# Patient Record
Sex: MALE | Race: WHITE
[De-identification: names, ages, dates, MRNs, and addresses within clinical notes are randomized per-mention and may not be internally consistent; named-entity substitution may affect disease eponyms.]

---

## 2018-09-18 PROBLEM — Z00.00 ENCOUNTER FOR PREVENTIVE HEALTH EXAMINATION: Status: ACTIVE | Noted: 2018-09-18

## 2018-10-11 ENCOUNTER — APPOINTMENT (OUTPATIENT)
Dept: ORTHOPEDIC SURGERY | Facility: CLINIC | Age: 78
End: 2018-10-11
Payer: MEDICARE

## 2018-10-11 DIAGNOSIS — Z78.9 OTHER SPECIFIED HEALTH STATUS: ICD-10-CM

## 2018-10-11 DIAGNOSIS — M25.511 PAIN IN RIGHT SHOULDER: ICD-10-CM

## 2018-10-11 PROCEDURE — 99214 OFFICE O/P EST MOD 30 MIN: CPT | Mod: 25

## 2018-10-11 PROCEDURE — 20610 DRAIN/INJ JOINT/BURSA W/O US: CPT | Mod: RT

## 2018-10-11 PROCEDURE — 73030 X-RAY EXAM OF SHOULDER: CPT | Mod: RT

## 2018-12-20 ENCOUNTER — APPOINTMENT (OUTPATIENT)
Dept: ORTHOPEDIC SURGERY | Facility: CLINIC | Age: 78
End: 2018-12-20
Payer: MEDICARE

## 2018-12-20 DIAGNOSIS — M67.921 UNSPECIFIED DISORDER OF SYNOVIUM AND TENDON, RIGHT UPPER ARM: ICD-10-CM

## 2018-12-20 DIAGNOSIS — M19.019 PRIMARY OSTEOARTHRITIS, UNSPECIFIED SHOULDER: ICD-10-CM

## 2018-12-20 DIAGNOSIS — M75.121 COMPLETE ROTATOR CUFF TEAR OR RUPTURE OF RIGHT SHOULDER, NOT SPECIFIED AS TRAUMATIC: ICD-10-CM

## 2018-12-20 PROCEDURE — 99213 OFFICE O/P EST LOW 20 MIN: CPT

## 2019-03-25 ENCOUNTER — APPOINTMENT (OUTPATIENT)
Dept: ORTHOPEDIC SURGERY | Facility: CLINIC | Age: 79
End: 2019-03-25
Payer: MEDICARE

## 2019-03-25 PROCEDURE — 20610 DRAIN/INJ JOINT/BURSA W/O US: CPT | Mod: RT

## 2019-03-25 PROCEDURE — 73562 X-RAY EXAM OF KNEE 3: CPT | Mod: RT

## 2019-03-25 PROCEDURE — 99213 OFFICE O/P EST LOW 20 MIN: CPT | Mod: 25

## 2019-04-08 ENCOUNTER — APPOINTMENT (OUTPATIENT)
Dept: ORTHOPEDIC SURGERY | Facility: CLINIC | Age: 79
End: 2019-04-08
Payer: MEDICARE

## 2019-04-08 PROCEDURE — 20610 DRAIN/INJ JOINT/BURSA W/O US: CPT | Mod: RT

## 2019-05-20 ENCOUNTER — APPOINTMENT (OUTPATIENT)
Dept: ORTHOPEDIC SURGERY | Facility: CLINIC | Age: 79
End: 2019-05-20
Payer: MEDICARE

## 2019-05-20 PROCEDURE — 99213 OFFICE O/P EST LOW 20 MIN: CPT

## 2019-07-08 ENCOUNTER — APPOINTMENT (OUTPATIENT)
Dept: ORTHOPEDIC SURGERY | Facility: CLINIC | Age: 79
End: 2019-07-08
Payer: MEDICARE

## 2019-07-08 DIAGNOSIS — Z86.39 PERSONAL HISTORY OF OTHER ENDOCRINE, NUTRITIONAL AND METABOLIC DISEASE: ICD-10-CM

## 2019-07-08 DIAGNOSIS — Z87.39 PERSONAL HISTORY OF OTHER DISEASES OF THE MUSCULOSKELETAL SYSTEM AND CONNECTIVE TISSUE: ICD-10-CM

## 2019-07-08 DIAGNOSIS — Z86.79 PERSONAL HISTORY OF OTHER DISEASES OF THE CIRCULATORY SYSTEM: ICD-10-CM

## 2019-07-08 PROCEDURE — 99213 OFFICE O/P EST LOW 20 MIN: CPT | Mod: 25

## 2019-07-08 PROCEDURE — 20610 DRAIN/INJ JOINT/BURSA W/O US: CPT | Mod: RT

## 2019-07-08 NOTE — ADDENDUM
[FreeTextEntry1] : I, Yoel Anaya, acted solely as a scribe for Dr. Chico Sifuentes on this date 07/08/2019 \par All medical record entries made by the Scribe were at my, Dr. Chico Sifuentes, direction and personally dictated by me on 07/08/2019 . I have reviewed the chart and agree that the record accurately reflects my personal performance of the history, physical exam, assessment and plan. I have also personally directed, reviewed, and agreed with the chart.

## 2019-07-08 NOTE — PHYSICAL EXAM
[UE/LE] : Sensory: Intact in bilateral upper & lower extremities [de-identified] : Constitutional\par o Appearance : well-nourished, well developed, alert, in no acute distress \par Head and Face\par o Head :\par ¦ Inspection : atraumatic, normocephalic\par Neurologic\par o Mental Status Examination :\par ¦ Orientation : alert and oriented X 3\par Psychiatric\par o Mood and Affect: mood normal, affect appropriate \par Cardiovascular\par o Observation/Palpation : - no swelling,normal pulses, normal temperature \par Lymphatic\par o Additional Nodes : No palpable lymph nodes present \par \par Right Lower Extremity\par Hip:\par o Buttock : no tenderness, swelling or deformities \par o Right  Hip :\par ¦ Inspection/Palpation : no tenderness, swelling or deformities\par ¦ Range of Motion : full and painless in all planes, no crepitance\par ¦ Stability : joint stability intact\par ¦ Strength : extension, flexion, adduction, abduction, internal rotation and external rotation 4+/5 \par  \par o Right Knee : peripatellar but minimal medial compartment tenderness to palpation, no swelling, hypertrophic changes of the medial compartment\par ¦ Range of Motion :5-120 degrees without pain, decreased patellofemoral glide with crepitance\par ¦ Stability : mild valgus or varus instability present on provocative testing\par ¦ Strength : flexion and extension 4+/5\par ¦ Tests and Signs : negative Anterior Drawer\par ¦ Dorsalis Pedis Artery : pulse 2+\par ¦ Posterior Tibial Artery : pulse 2+ \par \par Left Lower Extremity\par Hip:\par o Buttock : no tenderness, swelling or deformities \par o Left Hip :\par ¦ Inspection/Palpation : no tenderness, swelling or deformities\par ¦ Range of Motion : full and painless in all planes, no crepitance\par ¦ Stability : joint stability intact\par ¦ Strength : extension, flexion, adduction, abduction, internal rotation and external rotation 5/5 \par  \par o  Left Knee :\par ¦ Inspection/Palpation : parapatellar medial compartment tenderness to palpation, no swelling\par ¦ Range of Motion : 0-120 degrees, no crepitance\par ¦ Stability : no valgus or varus instability present on provocative testing\par ¦ Strength : flexion and extension 5/5\par ¦ Tests and Signs : negative Anterior Drawer, negative Lachman, negative Suellen  \par \par Gait and Station\par Gait: varus thrust on the right when walking with cane in the right hand, no significant extremity swelling or lymphedema, trophic changes of the lower extremity bilaterally\par \par o Knee injection : Indication- knee osteoarthritis, Anatomic location- right intra-articular joint space, Spray - area was sterilized with Betadine and alcohol and anesthetized with Ethyl Chloride , needle used-20G, Medications given- 5cc's lidocaine, 0.5cc's kenalog, 0.5 cc's dexamethasone, and patient tolerated it well.\par \par

## 2019-07-08 NOTE — DISCUSSION/SUMMARY
[de-identified] : I discussed the underlying pathophysiology of the patient's condition in great detail with the patient. The patient elected to receive a cortisone injection into the right knee, and tolerated it well. The use of ice and rest was reviewed with the patient. I told the patient to extend and bend his leg, and to take it easy today. He may resume his activities tomorrow. The patient is not a candidate for a TKR at this time due to medical comorbidities. \par \par FU in 4-6 weeks

## 2019-07-08 NOTE — HISTORY OF PRESENT ILLNESS
[de-identified] : 79 year old male presents with right knee pain. He states that when he stands from a sitting position, he feels a sharp pain in her right knee. He does not note any pain when he walks. He is known to have significant arthritis of his right knee, but is not a candidate for a TKR due to his medical issues. XRays from March show significant tricompartmental osteoarthritis. He is being maintained with cortisone injections.Viscosupplimentation has not helped in the past. . He ambulates with a cane.

## 2020-01-29 ENCOUNTER — APPOINTMENT (OUTPATIENT)
Dept: ORTHOPEDIC SURGERY | Facility: CLINIC | Age: 80
End: 2020-01-29
Payer: MEDICARE

## 2020-01-29 PROCEDURE — 73562 X-RAY EXAM OF KNEE 3: CPT | Mod: RT

## 2020-01-29 PROCEDURE — 99214 OFFICE O/P EST MOD 30 MIN: CPT | Mod: 25

## 2020-01-29 PROCEDURE — 20610 DRAIN/INJ JOINT/BURSA W/O US: CPT | Mod: RT

## 2020-11-25 ENCOUNTER — APPOINTMENT (OUTPATIENT)
Dept: ORTHOPEDIC SURGERY | Facility: CLINIC | Age: 80
End: 2020-11-25
Payer: MEDICARE

## 2020-11-25 PROCEDURE — 20610 DRAIN/INJ JOINT/BURSA W/O US: CPT | Mod: RT

## 2020-11-25 PROCEDURE — 99213 OFFICE O/P EST LOW 20 MIN: CPT | Mod: 25

## 2021-04-08 ENCOUNTER — APPOINTMENT (OUTPATIENT)
Dept: ORTHOPEDIC SURGERY | Facility: CLINIC | Age: 81
End: 2021-04-08

## 2021-08-09 ENCOUNTER — APPOINTMENT (OUTPATIENT)
Dept: ORTHOPEDIC SURGERY | Facility: CLINIC | Age: 81
End: 2021-08-09
Payer: MEDICARE

## 2021-08-09 PROCEDURE — 73562 X-RAY EXAM OF KNEE 3: CPT | Mod: RT

## 2021-08-09 PROCEDURE — 20610 DRAIN/INJ JOINT/BURSA W/O US: CPT | Mod: RT

## 2021-08-09 PROCEDURE — 99213 OFFICE O/P EST LOW 20 MIN: CPT | Mod: 25

## 2021-08-09 NOTE — PHYSICAL EXAM
[UE/LE] : Sensory: Intact in bilateral upper & lower extremities [de-identified] : Constitutional\par o Appearance : well-nourished, well developed, alert, in no acute distress \par Head and Face\par o Head :\par ¦ Inspection : atraumatic, normocephalic\par Neurologic\par o Mental Status Examination :\par ¦ Orientation : alert and oriented X 3\par Psychiatric\par o Mood and Affect: mood normal, affect appropriate \par Cardiovascular\par o Observation/Palpation : - no swelling,normal pulses, normal temperature \par Lymphatic\par o Additional Nodes : No palpable lymph nodes present \par \par Right Lower Extremity\par Hip:\par o Buttock : no tenderness, swelling or deformities \par o Right  Hip :\par ¦ Inspection/Palpation : no tenderness, swelling or deformities\par ¦ Range of Motion : full and painless in all planes, no crepitance\par ¦ Stability : joint stability intact\par ¦ Strength : extension, flexion, adduction, abduction, internal rotation and external rotation 5/5 \par  \par o Right Knee : medial compartment tenderness, no swelling, severe varus deformity, hypertrophic changes of the medial compartment\par ¦ Range of Motion : 2-115°, decreased patellofemoral glide with crepitance\par ¦ Stability : valgus / varus instability present on provocative testing\par ¦ Strength : flexion and extension 5/5\par ¦ Tests and Signs : negative Anterior Drawer, negative Lachman, negative Suellen\par \par Left Lower Extremity\par o Buttock : no tenderness, swelling or deformities \par o Left Hip :\par ¦ Inspection/Palpation : no tenderness, swelling or deformities\par ¦ Range of Motion : full and painless in all planes, no crepitance\par ¦ Stability : joint stability intact\par ¦ Strength : extension, flexion, adduction, abduction, internal rotation and external rotation 5/5 \par  \par o Left Knee :\par ¦ Inspection/Palpation : parapatellar medial compartment tenderness to palpation, no swelling\par ¦ Range of Motion : 0-120°, no crepitance\par ¦ Stability : no valgus or varus instability present on provocative testing\par ¦ Strength : flexion and extension 5/5\par ¦ Tests and Signs : negative Anterior Drawer, negative Lachman, negative Suellen  \par \par Gait and Station\par Gait: varus thrust on the right when walking with cane in the right hand, no significant extremity swelling or lymphedema, trophic changes of both ankles, poor balance and difficulty with steps\par \par Radiology Results\par o Right Knee : Standing AP, lateral and tunnel views of the knee were obtained and reveal bone on bone in the medial compartment with severe patellofemoral arthritis and calcification of the femoral and popliteal artery.\par \par o Right Knee injection : Indication- knee osteoarthritis, Anatomic location- right intra-articular joint space, Spray - area was sterilized with Betadine and alcohol and anesthetized with Ethyl Chloride , needle used-20G, Medications given- 5cc's lidocaine, 0.5cc's kenalog, 0.5 cc's dexamethasone, and patient tolerated it well.

## 2021-08-09 NOTE — HISTORY OF PRESENT ILLNESS
[de-identified] : 81 year old male presents complaining of right knee pain. He is known to have severe arthritis of his right knee, but is not a candidate for a TKR due to his cardiac history. He is being maintained with cortisone injections. Viscosupplementation has not helped in the past. At his last visit on 11/25/2020 he received a cortisone injection. He notes this injcetion seemed to help until recently. He presents for another cortisone injection today (08/09/2021). Pmhx: 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 He is on baby aspirin and no other blood thinners. He is fully COVID-19 vaccinated.

## 2021-08-09 NOTE — ADDENDUM
[FreeTextEntry1] : I, Freeman Atkins, acted solely as a scribe for Dr. Chico Sifuentes on this date 08/09/2021.\par All medical record entries made by the Scribe were at my, Dr. Chico Sifuentes, direction and personally dictated by me on 08/09/2021. I have reviewed the chart and agree that the record accurately reflects my personal performance of the history, physical exam, assessment and plan. I have also personally directed, reviewed, and agreed with the chart.

## 2021-08-09 NOTE — DISCUSSION/SUMMARY
[de-identified] : I discussed the underlying pathophysiology of the patient's condition in great detail with the patient. I went over the patient's x-rays with them in great detail. The patient elected to receive a cortisone injection into his right knee today, and tolerated it well. I instructed the patient on ROM exercises, and told them to take it easy. The use of ice and rest was reviewed with the patient. The patient may resume activities tomorrow. All of his questions were answered. He understands and consents to the plan.\par \par FU in 6-8 weeks.\par after a right knee cortisone injection today (08/09/2021).

## 2021-11-15 ENCOUNTER — APPOINTMENT (OUTPATIENT)
Dept: ORTHOPEDIC SURGERY | Facility: CLINIC | Age: 81
End: 2021-11-15
Payer: MEDICARE

## 2021-11-15 DIAGNOSIS — M47.816 SPONDYLOSIS W/OUT MYELOPATHY OR RADICULOPATHY, LUMBAR REGION: ICD-10-CM

## 2021-11-15 DIAGNOSIS — R26.89 OTHER ABNORMALITIES OF GAIT AND MOBILITY: ICD-10-CM

## 2021-11-15 PROCEDURE — 99214 OFFICE O/P EST MOD 30 MIN: CPT | Mod: 25

## 2021-11-15 PROCEDURE — 20610 DRAIN/INJ JOINT/BURSA W/O US: CPT | Mod: RT

## 2021-11-15 NOTE — ADDENDUM
[FreeTextEntry1] : I, Freeman Atkins, acted solely as a scribe for Dr. Chico Sifuentes on this date 11/15/2021.\par All medical record entries made by the Scribe were at my, Dr. Chico Sifuentes, direction and personally dictated by me on 11/15/2021. I have reviewed the chart and agree that the record accurately reflects my personal performance of the history, physical exam, assessment and plan. I have also personally directed, reviewed, and agreed with the chart.

## 2021-11-15 NOTE — HISTORY OF PRESENT ILLNESS
[de-identified] : 81 year old male presents complaining of right knee pain. 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 He is being maintained with cortisone injections. Viscosupplementation has not helped in the past. His most recent cortisone injection was on 8/9/2021 and he reports it lasted for about 1 to 1.5 months. 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 He started ambulating with a walker 2 weeks ago. He notes that he is losing the feeling in his feet due to a brain injury. He is currently in PT 2x/week. Pmhx: 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 He is on baby aspirin and no other blood thinners. He is fully COVID-19 vaccinated. \par \par Radiology Results taken on 8/9/2021:\par o Right Knee : Standing AP, lateral and tunnel views of the knee were obtained and reveal bone on bone in the medial compartment with severe patellofemoral arthritis and calcification of the femoral and popliteal artery.

## 2021-11-15 NOTE — PHYSICAL EXAM
[UE/LE] : Sensory: Intact in bilateral upper & lower extremities [de-identified] : Constitutional\par o Appearance : well-nourished, well developed, alert, in no acute distress \par Head and Face\par o Head :\par ¦ Inspection : atraumatic, normocephalic\par Neurologic\par o Mental Status Examination :\par ¦ Orientation : alert and oriented X 3\par Psychiatric\par o Mood and Affect: mood normal, affect appropriate \par Cardiovascular\par o Observation/Palpation : - no swelling,normal pulses, normal temperature \par Lymphatic\par o Additional Nodes : No palpable lymph nodes present \par \par Right Lower Extremity\par Hip:\par o Buttock : no tenderness, swelling or deformities \par o Right  Hip :\par ¦ Inspection/Palpation : no tenderness, swelling or deformities\par ¦ Range of Motion : full and painless in all planes, no crepitance\par ¦ Stability : joint stability intact\par ¦ Strength : extension, flexion, adduction, abduction, internal rotation and external rotation 5/5 \par  \par o Right Knee :\par ¦ Inspection/Palpation : medial compartment tenderness, no swelling, severe varus deformity, hypertrophic change of the medial compartment, skin is very thin\par ¦ Range of Motion : 0-105°, decreased patellofemoral glide with crepitance\par ¦ Stability : valgus / varus instability present on provocative testing\par ¦ Strength : flexion and extension 5/5\par ¦ Tests and Signs : negative Anterior Drawer, negative Lachman, negative Suellen\par \par Left Lower Extremity\par o Buttock : no tenderness, swelling or deformities \par o Left Hip :\par ¦ Inspection/Palpation : no tenderness, swelling or deformities\par ¦ Range of Motion : full and painless in all planes, no crepitance\par ¦ Stability : joint stability intact\par ¦ Strength : extension, flexion, adduction, abduction, internal rotation and external rotation 5/5 \par  \par o Left Knee :\par ¦ Inspection/Palpation : parapatellar medial compartment tenderness to palpation, no swelling\par ¦ Range of Motion : 0-120°, no crepitance\par ¦ Stability : no valgus or varus instability present on provocative testing\par ¦ Strength : flexion and extension 5/5\par ¦ Tests and Signs : negative Anterior Drawer, negative Lachman, negative Suellen  \par \par Gait and Station\par Gait: ambulating with a walker, varus thrust on the right, no significant extremity swelling or lymphedema, trophic changes of both ankles, poor balance and difficulty with steps, no foot drop\par \par o Right Knee injection : Indication- knee osteoarthritis, Anatomic location- right intra-articular joint space, Spray - area was sterilized with Betadine and alcohol and anesthetized with Ethyl Chloride , needle used-20G, Medications given- 5cc's lidocaine, 0.5cc's kenalog, 0.5 cc's dexamethasone, and patient tolerated it well.

## 2021-11-15 NOTE — DISCUSSION/SUMMARY
[de-identified] : I went over the pathophysiology of the patient's symptoms in great detail with the patient. I am recommending the patient continue going to physical therapy for balance. A prescription was provided. The patient elected to receive a cortisone injection into his right knee today, and tolerated it well. I instructed the patient on ROM exercises, and told them to take it easy. The use of ice and rest was reviewed with the patient. The patient may resume activities tomorrow. I am recommending we try viscosupplementation again because he cannot have surgery. He will follow-up in 2-3 weeks for a right knee Monovisc injection. All of his questions were answered. He understands and consents to the plan.\par \par FU 2-3 weeks.\par after continuing PT for balance.\par after a right knee cortisone injection today (11/15/2021).\par for a right knee Monovisc injection.  \par

## 2021-12-06 ENCOUNTER — APPOINTMENT (OUTPATIENT)
Dept: ORTHOPEDIC SURGERY | Facility: CLINIC | Age: 81
End: 2021-12-06
Payer: MEDICARE

## 2021-12-06 PROCEDURE — 20611 DRAIN/INJ JOINT/BURSA W/US: CPT | Mod: RT

## 2021-12-06 NOTE — DISCUSSION/SUMMARY
[de-identified] : I went over the pathophysiology of the patient's symptoms in great detail with the patient. The patient elected to receive a Monovisc injection into his right knee today and tolerated it well. I instructed the patient on ROM exercises and told them to take it easy. The use of ice and rest was reviewed with the patient. The patient may resume activities tomorrow. I reminded the patient that it takes 4 to 6 weeks after the injection to feel symptom relief. All of his questions were answered. He understands and consents to the plan. \par \par FU 6 weeks.\par after a right knee Monovisc injection today (12/06/2021).

## 2021-12-06 NOTE — PHYSICAL EXAM
[UE/LE] : Sensory: Intact in bilateral upper & lower extremities [de-identified] : Constitutional\par o Appearance : well-nourished, well developed, alert, in no acute distress \par Head and Face\par o Head :\par ¦ Inspection : atraumatic, normocephalic\par Neurologic\par o Mental Status Examination :\par ¦ Orientation : alert and oriented X 3\par Psychiatric\par o Mood and Affect: mood normal, affect appropriate \par Cardiovascular\par o Observation/Palpation : - no swelling,normal pulses, normal temperature \par Lymphatic\par o Additional Nodes : No palpable lymph nodes present \par \par Right Lower Extremity\par Hip:\par o Buttock : no tenderness, swelling or deformities \par o Right  Hip :\par ¦ Inspection/Palpation : no tenderness, swelling or deformities\par ¦ Range of Motion : full and painless in all planes, no crepitance\par ¦ Stability : joint stability intact\par ¦ Strength : extension, flexion, adduction, abduction, internal rotation and external rotation 5/5 \par  \par o Right Knee :\par ¦ Inspection/Palpation : medial compartment tenderness, no swelling, severe varus deformity, hypertrophic change of the medial compartment, skin is very thin, ecchymosis where his cortisone injection was, trophic changes\par ¦ Range of Motion : 3-120°, decreased patellofemoral glide with crepitance\par ¦ Stability : valgus / varus instability present on provocative testing\par ¦ Strength : flexion and extension 5/5\par ¦ Tests and Signs : negative Anterior Drawer, negative Lachman, negative Suellen\par \par Left Lower Extremity\par o Buttock : no tenderness, swelling or deformities \par o Left Hip :\par ¦ Inspection/Palpation : no tenderness, swelling or deformities\par ¦ Range of Motion : full and painless in all planes, no crepitance\par ¦ Stability : joint stability intact\par ¦ Strength : extension, flexion, adduction, abduction, internal rotation and external rotation 5/5 \par  \par o Left Knee :\par ¦ Inspection/Palpation : parapatellar medial compartment tenderness to palpation, no swelling\par ¦ Range of Motion : 0-120°, no crepitance\par ¦ Stability : no valgus or varus instability present on provocative testing\par ¦ Strength : flexion and extension 5/5\par ¦ Tests and Signs : negative Anterior Drawer, negative Lachman, negative Suellen  \par \par Gait and Station\par Gait: ambulating with a walker, varus thrust on the right, no significant extremity swelling or lymphedema, trophic changes of both ankles, poor balance and difficulty with steps, no foot drop\par \par o Right Knee injection : Indication- knee osteoarthritis, Anatomic location- right intra-articular joint space, Spray - area was sterilized with Betadine and alcohol and anesthetized with Ethyl Chloride, needle used-20G, Medications given- 4cc's Monovisc under Ultrasound guidance, and patient tolerated it well.  oLot# [id number]  oExp#: [id number]

## 2021-12-06 NOTE — ADDENDUM
[FreeTextEntry1] : I, Freeman Atkins, acted solely as a scribe for Dr. Chico Sifuentes on this date 12/06/2021.\par All medical record entries made by the Scribe were at my, Dr. Chico Sifuentes, direction and personally dictated by me on 12/06/2021. I have reviewed the chart and agree that the record accurately reflects my personal performance of the history, physical exam, assessment and plan. I have also personally directed, reviewed, and agreed with the chart.

## 2021-12-06 NOTE — HISTORY OF PRESENT ILLNESS
[de-identified] : 81 year old male presents complaining of right knee pain. 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 He is being maintained with cortisone injections. Viscosupplementation has not helped in the past. He received a cortisone injection on 11/15/21 but does not think it helped. He presents for a Monovisc injection today (12/06/2021). 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 He is ambulating with a walker. He is currently in PT 2x/week. Pmhx: 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 He is on baby aspirin and no other blood thinners. He is fully COVID-19 vaccinated. \par \par Radiology Results taken on 8/9/2021:\par o Right Knee : Standing AP, lateral and tunnel views of the knee were obtained and reveal bone on bone in the medial compartment with severe patellofemoral arthritis and calcification of the femoral and popliteal artery.

## 2022-05-12 ENCOUNTER — APPOINTMENT (OUTPATIENT)
Dept: ORTHOPEDIC SURGERY | Facility: CLINIC | Age: 82
End: 2022-05-12
Payer: MEDICARE

## 2022-05-12 PROCEDURE — 20610 DRAIN/INJ JOINT/BURSA W/O US: CPT | Mod: RT

## 2022-05-12 PROCEDURE — 99213 OFFICE O/P EST LOW 20 MIN: CPT | Mod: 25

## 2022-05-12 NOTE — DISCUSSION/SUMMARY
[de-identified] : I went over the pathophysiology of the patient's symptoms in great detail with the patient and his wife. The patient elected to receive a cortisone injection into his right knee today and tolerated it well. I instructed the patient on ROM exercises and told them to take it easy. The use of ice and rest was reviewed with the patient. The patient may resume activities tomorrow. I advised him to ride the bike on low tension. All of their questions were answered. They understand and consent to the plan. \par \par FU PRN\par after a right knee cortisone injection today (05/12/2022).

## 2022-05-12 NOTE — PHYSICAL EXAM
[UE/LE] : Sensory: Intact in bilateral upper & lower extremities [de-identified] : Constitutional\par o Appearance : well-nourished, well developed, alert, in no acute distress \par Head and Face\par o Head :\par ¦ Inspection : atraumatic, normocephalic\par Neurologic\par o Mental Status Examination :\par ¦ Orientation : alert and oriented X 3\par Psychiatric\par o Mood and Affect: mood normal, affect appropriate \par Cardiovascular\par o Observation/Palpation : - no swelling,normal pulses, normal temperature \par Lymphatic\par o Additional Nodes : No palpable lymph nodes present \par \par Right Lower Extremity\par o Buttock : no tenderness, swelling or deformities \par o Right Hip :\par ¦ Inspection/Palpation : no tenderness, swelling or deformities\par ¦ Range of Motion : full and painless in all planes, no crepitance\par ¦ Stability : joint stability intact\par ¦ Strength : extension, flexion, adduction, abduction, internal rotation and external rotation 5/5 \par  \par o Right Knee :\par ¦ Inspection/Palpation : medial compartment tenderness, no swelling, severe varus deformity, hypertrophic change of the medial compartment, skin is very thin, trophic changes\par ¦ Range of Motion : 3-120°, decreased patellofemoral glide with crepitance\par ¦ Stability : valgus / varus instability present on provocative testing\par ¦ Strength : flexion and extension 5/5\par ¦ Tests and Signs : negative Anterior Drawer, negative Lachman, negative Suellen\par \par Left Lower Extremity\par o Buttock : no tenderness, swelling or deformities \par o Left Hip :\par ¦ Inspection/Palpation : no tenderness, swelling or deformities\par ¦ Range of Motion : full and painless in all planes, no crepitance\par ¦ Stability : joint stability intact\par ¦ Strength : extension, flexion, adduction, abduction, internal rotation and external rotation 5/5 \par  \par o Left Knee :\par ¦ Inspection/Palpation : parapatellar medial compartment tenderness to palpation, no swelling\par ¦ Range of Motion : 0-120°, no crepitance\par ¦ Stability : no valgus or varus instability present on provocative testing\par ¦ Strength : flexion and extension 5/5\par ¦ Tests and Signs : negative Anterior Drawer, negative Lachman, negative Suellen  \par \par Gait and Station\par Gait: ambulating with a walker, varus thrust on the right, no significant extremity swelling or lymphedema, trophic changes of both legs, poor balance and difficulty with steps, no foot drop\par \par o Right Knee injection : Indication- knee osteoarthritis, Anatomic location- right intra-articular joint space, Spray - area was sterilized with Betadine and alcohol and anesthetized with Ethyl Chloride , needle used-20G, Medications given- 5cc's lidocaine, 0.5cc's kenalog, 0.5 cc's dexamethasone, and patient tolerated it well.

## 2022-05-12 NOTE — ADDENDUM
[FreeTextEntry1] : I, Freeman Atkins, acted solely as a scribe for Dr. Chico Sifuentes on this date 05/12/2022.\par All medical record entries made by the Scribe were at my, Dr. Chico Sifuentes, direction and personally dictated by me on 05/12/2022. I have reviewed the chart and agree that the record accurately reflects my personal performance of the history, physical exam, assessment and plan. I have also personally directed, reviewed, and agreed with the chart.

## 2022-05-12 NOTE — HISTORY OF PRESENT ILLNESS
[de-identified] : 82 year old male presents complaining of right knee pain. 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 He is being maintained with cortisone injections. HA injections have not helped in the past. He received a cortisone injection on 11/15/21 and a Monovisc injection on 12/06/21. He does not think the ge shot helped. He has been going to the gym and riding the bike, and feels a little better after. His knee has been acting up recently and he is requesting another cortisone injection today (05/12/2022). 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 He is ambulating with a walker. He participates in PT twice a week. 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 He is on baby aspirin and no other blood thinners.  \par \par Radiology Results taken on 8/9/2021:\par o Right Knee : Standing AP, lateral and tunnel views of the knee were obtained and reveal bone on bone in the medial compartment with severe patellofemoral arthritis and calcification of the femoral and popliteal artery.

## 2022-08-15 ENCOUNTER — APPOINTMENT (OUTPATIENT)
Dept: ORTHOPEDIC SURGERY | Facility: CLINIC | Age: 82
End: 2022-08-15

## 2022-08-15 PROCEDURE — 20610 DRAIN/INJ JOINT/BURSA W/O US: CPT | Mod: RT

## 2022-08-15 PROCEDURE — 99213 OFFICE O/P EST LOW 20 MIN: CPT | Mod: 25

## 2022-08-15 PROCEDURE — 73564 X-RAY EXAM KNEE 4 OR MORE: CPT | Mod: RT

## 2022-08-15 NOTE — DISCUSSION/SUMMARY
[de-identified] : I went over the pathophysiology of the patient's symptoms in great detail with the patient and his wife. I went over the patient's x-rays with them in great detail. The patient elected to receive a cortisone injection into his right knee today and tolerated it well. I instructed the patient on ROM exercises and told them to take it easy. The use of ice and rest was reviewed with the patient. The patient may resume activities tomorrow. All of their questions were answered. They understand and consent to the plan.\par \par FU in 1 month.\par after a right knee cortisone injection today (08/15/2022).

## 2022-08-15 NOTE — PHYSICAL EXAM
[UE/LE] : Sensory: Intact in bilateral upper & lower extremities [de-identified] : Constitutional\par o Appearance : well-nourished, well developed, alert, in no acute distress \par Head and Face\par o Head :\par ¦ Inspection : atraumatic, normocephalic\par Neurologic\par o Mental Status Examination :\par ¦ Orientation : alert and oriented X 3\par Psychiatric\par o Mood and Affect: mood normal, affect appropriate \par Cardiovascular\par o Observation/Palpation : - no swelling,normal pulses, normal temperature \par Lymphatic\par o Additional Nodes : No palpable lymph nodes present \par \par Right Lower Extremity\par o Buttock : no tenderness, swelling or deformities \par o Right Hip :\par ¦ Inspection/Palpation : no tenderness, swelling or deformities\par ¦ Range of Motion : full and painless in all planes, no crepitance\par ¦ Stability : joint stability intact\par ¦ Strength : extension, flexion, adduction, abduction, internal rotation and external rotation 5/5 \par  \par o Right Knee :\par ¦ Inspection/Palpation : medial compartment tenderness, no swelling, severe varus deformity with hypertrophic changes of the medial compartment, skin is very thin, trophic changes of the leg \par ¦ Range of Motion : 2-105°, decreased patellofemoral glide with crepitance\par ¦ Stability : valgus / varus instability present on provocative testing\par ¦ Strength : flexion and extension 5/5\par ¦ Tests and Signs : negative Anterior Drawer, negative Lachman, negative Suellen\par \par Left Lower Extremity\par o Buttock : no tenderness, swelling or deformities \par o Left Hip :\par ¦ Inspection/Palpation : no tenderness, swelling or deformities\par ¦ Range of Motion : full and painless in all planes, no crepitance\par ¦ Stability : joint stability intact\par ¦ Strength : extension, flexion, adduction, abduction, internal rotation and external rotation 5/5 \par  \par o Left Knee :\par ¦ Inspection/Palpation : parapatellar medial compartment tenderness to palpation, no swelling\par ¦ Range of Motion : 0-120°, no crepitance\par ¦ Stability : no valgus or varus instability present on provocative testing\par ¦ Strength : flexion and extension 5/5\par ¦ Tests and Signs : negative Anterior Drawer, negative Lachman, negative Suellen  \par \par Gait and Station\par Gait: ambulating with a walker, varus thrust on the right, no significant extremity swelling or lymphedema, trophic changes of both legs, poor balance and difficulty with steps, no foot drop\par \par Radiology Results\par o Right Knee : Standing AP, lateral, tunnel, and skyline views of the knee were obtained and reveal bone on bone in the medial and patellofemoral compartments with calcification of the femoral and popliteal arteries.\par \par o Right Knee injection : Indication- knee osteoarthritis, Anatomic location- right intra-articular joint space, Spray - area was sterilized with Betadine and alcohol and anesthetized with Ethyl Chloride , needle used-20G, Medications given- 5cc's lidocaine, 0.5cc's kenalog, 0.5 cc's dexamethasone, and patient tolerated it well.

## 2022-08-15 NOTE — HISTORY OF PRESENT ILLNESS
[de-identified] : 82 year old male presents complaining of right knee pain. 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 He is being maintained with cortisone injections every 3 months. His last cortisone injection was on 05/12/22. He states this injection is wearing off and is requesting another one today (08/15/2022). HA injections have not helped in the past. He last had a Monovisc injection on 12/06/21. He does not think the gel shot helped. 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 He is ambulating with a walker. He participates in PT twice a week. 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 He is on baby aspirin and no other blood thinners.

## 2022-08-15 NOTE — ADDENDUM
[FreeTextEntry1] : I, Freeman Atkins, acted solely as a scribe for Dr. Chico Sifuentes on this date 08/15/2022.\par All medical record entries made by the Scribe were at my, Dr. Chico Sifuentes, direction and personally dictated by me on 08/15/2022. I have reviewed the chart and agree that the record accurately reflects my personal performance of the history, physical exam, assessment and plan. I have also personally directed, reviewed, and agreed with the chart.

## 2022-12-05 ENCOUNTER — APPOINTMENT (OUTPATIENT)
Dept: ORTHOPEDIC SURGERY | Facility: CLINIC | Age: 82
End: 2022-12-05

## 2022-12-05 DIAGNOSIS — M17.0 BILATERAL PRIMARY OSTEOARTHRITIS OF KNEE: ICD-10-CM

## 2022-12-05 PROCEDURE — 20610 DRAIN/INJ JOINT/BURSA W/O US: CPT | Mod: RT

## 2022-12-05 PROCEDURE — 99213 OFFICE O/P EST LOW 20 MIN: CPT | Mod: 25

## 2023-11-03 ENCOUNTER — APPOINTMENT (OUTPATIENT)
Dept: ORTHOPEDIC SURGERY | Facility: CLINIC | Age: 83
End: 2023-11-03
Payer: MEDICARE

## 2023-11-03 ENCOUNTER — NON-APPOINTMENT (OUTPATIENT)
Age: 83
End: 2023-11-03

## 2023-11-03 DIAGNOSIS — M47.812 SPONDYLOSIS W/OUT MYELOPATHY OR RADICULOPATHY, CERVICAL REGION: ICD-10-CM

## 2023-11-03 PROCEDURE — 72040 X-RAY EXAM NECK SPINE 2-3 VW: CPT

## 2023-11-03 PROCEDURE — 99204 OFFICE O/P NEW MOD 45 MIN: CPT | Mod: 25

## 2023-11-03 PROCEDURE — 20553 NJX 1/MLT TRIGGER POINTS 3/>: CPT
